# Patient Record
Sex: FEMALE | Race: BLACK OR AFRICAN AMERICAN | NOT HISPANIC OR LATINO | Employment: FULL TIME | URBAN - METROPOLITAN AREA
[De-identification: names, ages, dates, MRNs, and addresses within clinical notes are randomized per-mention and may not be internally consistent; named-entity substitution may affect disease eponyms.]

---

## 2021-03-25 ENCOUNTER — OFFICE VISIT (OUTPATIENT)
Dept: OBSTETRICS AND GYNECOLOGY | Facility: CLINIC | Age: 56
End: 2021-03-25
Payer: COMMERCIAL

## 2021-03-25 VITALS
HEIGHT: 67 IN | SYSTOLIC BLOOD PRESSURE: 113 MMHG | BODY MASS INDEX: 42.38 KG/M2 | WEIGHT: 270 LBS | DIASTOLIC BLOOD PRESSURE: 83 MMHG

## 2021-03-25 DIAGNOSIS — Z12.31 ENCOUNTER FOR SCREENING MAMMOGRAM FOR MALIGNANT NEOPLASM OF BREAST: ICD-10-CM

## 2021-03-25 DIAGNOSIS — Z01.419 ENCOUNTER FOR WELL WOMAN EXAM: Primary | ICD-10-CM

## 2021-03-25 PROCEDURE — 99386 PR PREVENTIVE VISIT,NEW,40-64: ICD-10-PCS | Mod: S$GLB,,, | Performed by: OBSTETRICS & GYNECOLOGY

## 2021-03-25 PROCEDURE — 99386 PREV VISIT NEW AGE 40-64: CPT | Mod: S$GLB,,, | Performed by: OBSTETRICS & GYNECOLOGY

## 2021-03-25 PROCEDURE — 3008F BODY MASS INDEX DOCD: CPT | Mod: CPTII,S$GLB,, | Performed by: OBSTETRICS & GYNECOLOGY

## 2021-03-25 PROCEDURE — 3008F PR BODY MASS INDEX (BMI) DOCUMENTED: ICD-10-PCS | Mod: CPTII,S$GLB,, | Performed by: OBSTETRICS & GYNECOLOGY

## 2021-03-25 RX ORDER — ALPRAZOLAM 0.5 MG/1
TABLET ORAL
COMMUNITY
End: 2023-02-02

## 2021-03-25 RX ORDER — DEXTROAMPHETAMINE SACCHARATE, AMPHETAMINE ASPARTATE, DEXTROAMPHETAMINE SULFATE AND AMPHETAMINE SULFATE 5; 5; 5; 5 MG/1; MG/1; MG/1; MG/1
1 TABLET ORAL DAILY
COMMUNITY
Start: 2021-03-02 | End: 2024-03-18

## 2021-03-25 RX ORDER — LOSARTAN POTASSIUM AND HYDROCHLOROTHIAZIDE 25; 100 MG/1; MG/1
1 TABLET ORAL DAILY
COMMUNITY
Start: 2021-01-16 | End: 2024-03-18

## 2021-03-25 RX ORDER — SCOLOPAMINE TRANSDERMAL SYSTEM 1 MG/1
PATCH, EXTENDED RELEASE TRANSDERMAL
COMMUNITY
End: 2021-03-25

## 2021-03-25 RX ORDER — OMEPRAZOLE 40 MG/1
CAPSULE, DELAYED RELEASE ORAL
COMMUNITY
Start: 2021-01-22

## 2021-03-25 RX ORDER — ZOSTER VACCINE RECOMBINANT, ADJUVANTED 50 MCG/0.5
KIT INTRAMUSCULAR
COMMUNITY
End: 2023-02-02

## 2021-03-25 RX ORDER — DICLOFENAC SODIUM 20 MG/G
SOLUTION TOPICAL
COMMUNITY
End: 2021-03-25

## 2021-03-25 RX ORDER — CITALOPRAM 20 MG/1
TABLET, FILM COATED ORAL
COMMUNITY
End: 2021-03-25

## 2021-03-25 RX ORDER — ARMODAFINIL 150 MG/1
TABLET ORAL
COMMUNITY
End: 2024-02-08

## 2021-03-25 RX ORDER — ASPIRIN 81 MG/1
TABLET ORAL
COMMUNITY
End: 2024-03-18

## 2021-03-25 RX ORDER — MELOXICAM 15 MG/1
TABLET ORAL
COMMUNITY
End: 2021-03-25

## 2021-03-25 RX ORDER — CYANOCOBALAMIN 1000 UG/ML
1 INJECTION, SOLUTION INTRAMUSCULAR; SUBCUTANEOUS
COMMUNITY
End: 2024-03-18

## 2021-03-25 RX ORDER — TOPIRAMATE 25 MG/1
25 TABLET ORAL DAILY
Qty: 60 TABLET | Refills: 1 | Status: SHIPPED | OUTPATIENT
Start: 2021-03-25 | End: 2021-06-21

## 2021-03-25 RX ORDER — ASPIRIN 325 MG
TABLET, DELAYED RELEASE (ENTERIC COATED) ORAL
COMMUNITY
End: 2021-03-25

## 2021-03-25 RX ORDER — ALBUTEROL SULFATE 90 UG/1
AEROSOL, METERED RESPIRATORY (INHALATION)
COMMUNITY
End: 2021-03-25

## 2021-03-30 ENCOUNTER — PATIENT MESSAGE (OUTPATIENT)
Dept: OBSTETRICS AND GYNECOLOGY | Facility: CLINIC | Age: 56
End: 2021-03-30

## 2021-06-21 RX ORDER — TOPIRAMATE 25 MG/1
TABLET ORAL
Qty: 90 TABLET | Refills: 3 | Status: SHIPPED | OUTPATIENT
Start: 2021-06-21

## 2023-01-12 DIAGNOSIS — Z01.419 ENCOUNTER FOR ANNUAL ROUTINE GYNECOLOGICAL EXAMINATION: Primary | ICD-10-CM

## 2023-02-02 ENCOUNTER — OFFICE VISIT (OUTPATIENT)
Dept: OBSTETRICS AND GYNECOLOGY | Facility: CLINIC | Age: 58
End: 2023-02-02
Payer: COMMERCIAL

## 2023-02-02 VITALS
HEART RATE: 62 BPM | WEIGHT: 267 LBS | SYSTOLIC BLOOD PRESSURE: 121 MMHG | DIASTOLIC BLOOD PRESSURE: 81 MMHG | BODY MASS INDEX: 41.82 KG/M2

## 2023-02-02 DIAGNOSIS — N95.2 VAGINAL ATROPHY: ICD-10-CM

## 2023-02-02 DIAGNOSIS — Z01.419 WELL WOMAN EXAM WITH ROUTINE GYNECOLOGICAL EXAM: Primary | ICD-10-CM

## 2023-02-02 PROCEDURE — 1159F MED LIST DOCD IN RCRD: CPT | Mod: CPTII,S$GLB,, | Performed by: OBSTETRICS & GYNECOLOGY

## 2023-02-02 PROCEDURE — 99396 PREV VISIT EST AGE 40-64: CPT | Mod: S$GLB,,, | Performed by: OBSTETRICS & GYNECOLOGY

## 2023-02-02 PROCEDURE — 3008F BODY MASS INDEX DOCD: CPT | Mod: CPTII,S$GLB,, | Performed by: OBSTETRICS & GYNECOLOGY

## 2023-02-02 PROCEDURE — 3074F PR MOST RECENT SYSTOLIC BLOOD PRESSURE < 130 MM HG: ICD-10-PCS | Mod: CPTII,S$GLB,, | Performed by: OBSTETRICS & GYNECOLOGY

## 2023-02-02 PROCEDURE — 3008F PR BODY MASS INDEX (BMI) DOCUMENTED: ICD-10-PCS | Mod: CPTII,S$GLB,, | Performed by: OBSTETRICS & GYNECOLOGY

## 2023-02-02 PROCEDURE — 3079F PR MOST RECENT DIASTOLIC BLOOD PRESSURE 80-89 MM HG: ICD-10-PCS | Mod: CPTII,S$GLB,, | Performed by: OBSTETRICS & GYNECOLOGY

## 2023-02-02 PROCEDURE — 1159F PR MEDICATION LIST DOCUMENTED IN MEDICAL RECORD: ICD-10-PCS | Mod: CPTII,S$GLB,, | Performed by: OBSTETRICS & GYNECOLOGY

## 2023-02-02 PROCEDURE — 99396 PR PREVENTIVE VISIT,EST,40-64: ICD-10-PCS | Mod: S$GLB,,, | Performed by: OBSTETRICS & GYNECOLOGY

## 2023-02-02 PROCEDURE — 3074F SYST BP LT 130 MM HG: CPT | Mod: CPTII,S$GLB,, | Performed by: OBSTETRICS & GYNECOLOGY

## 2023-02-02 PROCEDURE — 3079F DIAST BP 80-89 MM HG: CPT | Mod: CPTII,S$GLB,, | Performed by: OBSTETRICS & GYNECOLOGY

## 2023-02-02 RX ORDER — SEMAGLUTIDE 0.25 MG/.5ML
INJECTION, SOLUTION SUBCUTANEOUS
COMMUNITY
End: 2024-03-14

## 2023-02-02 NOTE — PROGRESS NOTES
Subjective:       Patient ID: Sondra Yan is a 57 y.o. female.    Chief Complaint:  Well Woman      History of Present Illness  Pt here for gyn annual.  History and past labs reviewed with patient.    Complaints frequent UTIs. Does admit to some vaginal dryness with intercourse.       Review of Systems  Review of Systems   Constitutional:  Negative for chills and fever.   Respiratory:  Negative for shortness of breath.    Cardiovascular:  Negative for chest pain.   Gastrointestinal:  Negative for abdominal pain, blood in stool, constipation, diarrhea, nausea, vomiting and reflux.   Genitourinary:  Negative for dysmenorrhea, dyspareunia, dysuria, hematuria, hot flashes, menorrhagia, menstrual problem, pelvic pain, vaginal bleeding, vaginal discharge, postcoital bleeding and vaginal dryness.   Musculoskeletal:  Negative for arthralgias and joint swelling.   Integumentary:  Negative for rash, hair changes, breast mass, nipple discharge and breast skin changes.   Psychiatric/Behavioral:  Negative for depression. The patient is not nervous/anxious.    Breast: Negative for asymmetry, lump, mass, nipple discharge and skin changes        Objective:     Vitals:    02/02/23 1359   BP: 121/81   Pulse: 62   Weight: 121.1 kg (267 lb)       Physical Exam:   Constitutional: She appears well-developed and well-nourished. No distress.    HENT:   Head: Normocephalic and atraumatic.    Eyes: Conjunctivae and EOM are normal.    Neck: No tracheal deviation present. No thyromegaly present.    Cardiovascular:       Exam reveals no clubbing, no cyanosis and no edema.        Pulmonary/Chest: Effort normal. No respiratory distress.        Abdominal: Soft. She exhibits no distension and no mass. There is no abdominal tenderness. There is no rebound and no guarding. No hernia.     Genitourinary:    Vagina, uterus and rectum normal.      Pelvic exam was performed with patient supine.   There is no rash, tenderness, lesion or injury on the  right labia. There is no rash, tenderness, lesion or injury on the left labia. Cervix is normal. Right adnexum displays no mass, no tenderness and no fullness. Left adnexum displays no mass, no tenderness and no fullness. Vaginal atrophy noted.                Skin: She is not diaphoretic. No cyanosis. Nails show no clubbing.        breast exam wnl- no nipple dc, skin changes, masses or lymph nodes palpated bilaterally    Assessment:        1. Well woman exam with routine gynecological exam    2. Vaginal atrophy                Plan:      Annual   Pap today   MMG ordered  Premarin cream rx and sample given   Risk assessment for inherited gyn cancer done   RTC  1 year     Patient was counseled today on current ASCCP pap guidelines, the recommendation for yearly pelvic exams, healthy diet and exercise routines, annual mammograms starting at age 40, and breast self awareness. She is to see her PCP for other health maintenance

## 2023-02-05 LAB — Lab: NORMAL

## 2024-01-23 DIAGNOSIS — Z01.419 ENCOUNTER FOR ANNUAL ROUTINE GYNECOLOGICAL EXAMINATION: Primary | ICD-10-CM

## 2024-02-08 ENCOUNTER — OFFICE VISIT (OUTPATIENT)
Dept: OBSTETRICS AND GYNECOLOGY | Facility: CLINIC | Age: 59
End: 2024-02-08
Payer: COMMERCIAL

## 2024-02-08 VITALS
DIASTOLIC BLOOD PRESSURE: 75 MMHG | BODY MASS INDEX: 43.01 KG/M2 | SYSTOLIC BLOOD PRESSURE: 108 MMHG | HEART RATE: 65 BPM | WEIGHT: 274.63 LBS

## 2024-02-08 DIAGNOSIS — Z00.00 ENCOUNTER FOR WELLNESS EXAMINATION: ICD-10-CM

## 2024-02-08 DIAGNOSIS — Z12.39 ENCOUNTER FOR SCREENING FOR MALIGNANT NEOPLASM OF BREAST, UNSPECIFIED SCREENING MODALITY: Primary | ICD-10-CM

## 2024-02-08 DIAGNOSIS — Z12.4 SCREENING FOR CERVICAL CANCER: ICD-10-CM

## 2024-02-08 DIAGNOSIS — Z13.820 SCREENING FOR OSTEOPOROSIS: ICD-10-CM

## 2024-02-08 PROCEDURE — 3008F BODY MASS INDEX DOCD: CPT | Mod: CPTII,S$GLB,, | Performed by: OBSTETRICS & GYNECOLOGY

## 2024-02-08 PROCEDURE — 99459 PELVIC EXAMINATION: CPT | Mod: S$GLB,,, | Performed by: OBSTETRICS & GYNECOLOGY

## 2024-02-08 PROCEDURE — 3074F SYST BP LT 130 MM HG: CPT | Mod: CPTII,S$GLB,, | Performed by: OBSTETRICS & GYNECOLOGY

## 2024-02-08 PROCEDURE — 3078F DIAST BP <80 MM HG: CPT | Mod: CPTII,S$GLB,, | Performed by: OBSTETRICS & GYNECOLOGY

## 2024-02-08 PROCEDURE — 99396 PREV VISIT EST AGE 40-64: CPT | Mod: S$GLB,,, | Performed by: OBSTETRICS & GYNECOLOGY

## 2024-02-08 PROCEDURE — 1159F MED LIST DOCD IN RCRD: CPT | Mod: CPTII,S$GLB,, | Performed by: OBSTETRICS & GYNECOLOGY

## 2024-02-08 RX ORDER — LOSARTAN POTASSIUM 100 MG/1
TABLET ORAL
COMMUNITY
End: 2024-03-14

## 2024-02-08 RX ORDER — NAPROXEN SODIUM 220 MG/1
TABLET, FILM COATED ORAL
COMMUNITY
End: 2024-03-18

## 2024-02-08 RX ORDER — BETAMETHASONE VALERATE 1.2 MG/G
AEROSOL, FOAM TOPICAL
COMMUNITY
End: 2024-03-14

## 2024-02-08 RX ORDER — MELOXICAM 15 MG/1
15 TABLET ORAL
COMMUNITY
Start: 2024-01-23 | End: 2024-03-14

## 2024-02-08 RX ORDER — PREDNISONE 20 MG/1
TABLET ORAL
COMMUNITY
Start: 2023-12-11 | End: 2024-03-14

## 2024-02-08 RX ORDER — CYANOCOBALAMIN 1000 UG/ML
1 INJECTION, SOLUTION INTRAMUSCULAR; SUBCUTANEOUS
COMMUNITY

## 2024-02-08 RX ORDER — TRAMADOL HYDROCHLORIDE 50 MG/1
TABLET ORAL
COMMUNITY
End: 2024-03-14

## 2024-02-08 NOTE — PROGRESS NOTES
Subjective:      Patient ID: Sondra Yan is a 58 y.o. female.    Chief Complaint:  Well Woman      History of Present Illness  HPI  This is a 58 year old female here for her annual exam and has no additional complaints  reports she is tired despite normal sleep and labs     GYN & OB History  No LMP recorded (lmp unknown). Patient is postmenopausal.   Date of Last Pap: No result found    OB History    Para Term  AB Living   2 2 2     3   SAB IAB Ectopic Multiple Live Births         1 3      # Outcome Date GA Lbr J Luis/2nd Weight Sex Delivery Anes PTL Lv   2A Term      Vag-Spont   RASHAD      Birth Comments: twins   2B Term      Vag-Spont   RASHAD   1 Term      Vag-Spont   RASHAD       Review of Systems  Review of Systems   Constitutional:  Negative for fatigue, fever and unexpected weight change.   Respiratory:  Negative for cough and shortness of breath.    Cardiovascular:  Negative for chest pain, palpitations and leg swelling.   Gastrointestinal:  Negative for abdominal pain, bloating, blood in stool, constipation, diarrhea, nausea and vomiting.   Genitourinary:  Negative for decreased libido, dysmenorrhea, dyspareunia, dysuria, frequency, genital sores, hematuria, menorrhagia, menstrual problem, pelvic pain, urgency, vaginal discharge, urinary incontinence and vaginal odor.   Musculoskeletal:  Negative for arthralgias and joint swelling.   Integumentary:  Negative for rash, mole/lesion, breast mass, nipple discharge, breast skin changes and breast tenderness.   Psychiatric/Behavioral: Negative.     Breast: Negative for asymmetry, lump, mass, nipple discharge, skin changes and tenderness         Objective:     Physical Exam:   Constitutional: She appears well-developed and well-nourished.      Neck: No thyroid mass and no thyromegaly present.     Pulmonary/Chest: Chest wall is not dull to percussion. She exhibits no mass, no tenderness, no bony tenderness, no laceration, no crepitus, no edema,  no deformity, no swelling and no retraction. Right breast exhibits no inverted nipple, no mass, no nipple discharge, no skin change, no tenderness, presence, no bleeding and no swelling. Left breast exhibits no inverted nipple, no mass, no nipple discharge, no skin change, no tenderness, presence, no bleeding and no swelling.        Abdominal: Soft. Bowel sounds are normal. There is no abdominal tenderness. Hernia confirmed negative in the right inguinal area and confirmed negative in the left inguinal area.     Genitourinary:    Vagina and uterus normal.      Pelvic exam was performed with patient supine.     Labial bartholins normal.There is no rash, tenderness, lesion or injury on the right labia. There is no rash, tenderness, lesion or injury on the left labia. Cervix is normal. Right adnexum displays no mass, no tenderness and no fullness. Left adnexum displays no mass, no tenderness and no fullness. No rectocele, cystocele or prolapse of vaginal walls in the vagina.                Skin: Skin is warm and dry.    Psychiatric: She has a normal mood and affect. Her speech is normal and behavior is normal.    Chaperone present        Assessment:     1. Encounter for screening for malignant neoplasm of breast, unspecified screening modality    2. Screening for cervical cancer    3. Screening for osteoporosis    4. Encounter for wellness examination              Plan:     Encounter for screening for malignant neoplasm of breast, unspecified screening modality  -     Mammo Digital Screening Bilat; Future; Expected date: 02/08/2024    Screening for cervical cancer  -     Liquid-based pap smear, screening    Screening for osteoporosis  -     DXA Bone Density Axial Skeleton 1 or more sites; Future; Expected date: 02/08/2024    Encounter for wellness examination

## 2024-02-09 DIAGNOSIS — Z12.11 SCREENING FOR COLON CANCER: Primary | ICD-10-CM

## 2024-02-12 ENCOUNTER — PATIENT MESSAGE (OUTPATIENT)
Dept: OBSTETRICS AND GYNECOLOGY | Facility: CLINIC | Age: 59
End: 2024-02-12
Payer: COMMERCIAL

## 2024-02-12 LAB — Lab: NORMAL

## 2024-03-06 ENCOUNTER — TELEPHONE (OUTPATIENT)
Dept: GASTROENTEROLOGY | Facility: CLINIC | Age: 59
End: 2024-03-06
Payer: COMMERCIAL

## 2024-03-06 DIAGNOSIS — Z12.11 COLON CANCER SCREENING: ICD-10-CM

## 2024-03-06 DIAGNOSIS — Z86.010 PERSONAL HISTORY OF COLONIC POLYPS: Primary | ICD-10-CM

## 2024-03-06 NOTE — TELEPHONE ENCOUNTER
We will need the records of her surgery to get more information. Find out who did her surgery and get records.   MLC

## 2024-03-06 NOTE — TELEPHONE ENCOUNTER
Called patient to direct rosa patient. Patient had her colon removed in 2018.  Patient wanted to know if she still needed to have colonoscopy. 3/6/24 LRA

## 2024-03-08 NOTE — TELEPHONE ENCOUNTER
Reviewed [facility]. [facility] the path from [facility]. Will review with Dr. Garcia..    Seems she had a right hemicolectomy on 10/17/2018 with Dr. Gudino.   Path reviewed:  Benign right ileocolectomy specimen with colonic diverticula.  Pericolic adipose tissue with scattered mixed acute and chronic inflammation in organizing fibrosis.  Benign appendix with fibrous obliteration.  Transmural defect of the colon adjacent to the cecum.  Iatrogenic defect is suspected.    MLC

## 2024-03-08 NOTE — TELEPHONE ENCOUNTER
OR note reviewed Patient had L hemicolectomy 10/12/2018 due to diverticular bleed. She still needs a colonoscopy. Only L side of large intestines were removed. It does note in his operative report previous lap-band? Please confirm with patient and add to surgery list. Will sign order once chart is updated.  AL

## 2024-03-08 NOTE — TELEPHONE ENCOUNTER
Spoke to patient. The colon surgery was by Dr. Gudino. The surgery was completed at [facility] looking in [facility] for records. If not will have to get patient to sign DOMINICK to request records. 3/8/24 LRA

## 2024-03-08 NOTE — TELEPHONE ENCOUNTER
Records from [id number] pulled from [facility]. ER visit, surgeries, path, along with EGD & upper gi series ordered by Dr. Butt. -KG

## 2024-03-10 NOTE — TELEPHONE ENCOUNTER
Records reviewed.  H/o colon polyps. Patient has essentially had a subtotal colectomy. She will need a colonoscopy with gastroscope (seems she may only have a short end portion of her colonoscopy). Prep will be CLD the day prior and one 238g bottle of MiraLAX. Technically she does not have to have sedation as she should not have pain from the procedure since she does not have much of a colon left, but ultimately up to the patient. If she does not want sedation, then we will need to request her to be the first (or last) case of the day and list on order. Let me know.  NBP

## 2024-03-11 NOTE — TELEPHONE ENCOUNTER
Called patient and she was in the middle of eating lunch. Patient stated she will call back later today. 3/11/24 LRA

## 2024-03-12 ENCOUNTER — TELEPHONE (OUTPATIENT)
Dept: GASTROENTEROLOGY | Facility: CLINIC | Age: 59
End: 2024-03-12

## 2024-03-12 NOTE — TELEPHONE ENCOUNTER
----- Message from Roseanna Cardoso sent at 3/12/2024 10:22 AM CDT -----  Contact: pt  Pt returning a call and can be reached at [phone]     Thanks,

## 2024-03-12 NOTE — TELEPHONE ENCOUNTER
Patient scheduled for Colonoscopy on July 24th. Patient states that since she will be having procedure in hospital anyway, she prefers to have sedation. Patient also informed that her prep will be a clear liquid diet with 1 bottle of miralax the day before. I did inform patient that we will be contacting her closer to time to review her prep instructions and day of procedure process.  BF

## 2024-03-14 VITALS — HEIGHT: 67 IN | BODY MASS INDEX: 41.12 KG/M2 | WEIGHT: 262 LBS

## 2024-03-14 RX ORDER — ASPIRIN 325 MG
1 TABLET, DELAYED RELEASE (ENTERIC COATED) ORAL
COMMUNITY

## 2024-03-14 RX ORDER — LOSARTAN POTASSIUM AND HYDROCHLOROTHIAZIDE 25; 100 MG/1; MG/1
1 TABLET ORAL DAILY
COMMUNITY

## 2024-03-14 RX ORDER — ALBUTEROL SULFATE 90 UG/1
AEROSOL, METERED RESPIRATORY (INHALATION)
COMMUNITY

## 2024-03-14 RX ORDER — POTASSIUM CHLORIDE 750 MG/1
1 TABLET, EXTENDED RELEASE ORAL DAILY
COMMUNITY
Start: 2024-02-26

## 2024-03-14 RX ORDER — DEXTROAMPHETAMINE SACCHARATE, AMPHETAMINE ASPARTATE, DEXTROAMPHETAMINE SULFATE AND AMPHETAMINE SULFATE 5; 5; 5; 5 MG/1; MG/1; MG/1; MG/1
1 TABLET ORAL DAILY
COMMUNITY
Start: 2024-01-29

## 2024-03-14 RX ORDER — ASPIRIN 81 MG/1
1 TABLET ORAL DAILY
COMMUNITY

## 2024-03-14 NOTE — TELEPHONE ENCOUNTER
Patient denied having any current problems or issues. Patient also denied having any hx of seizures, sleep apnea, or kidney disease.Patients maternal cousin had Crohn's at age 45.  Previous colonoscopy by Dr. Clifton on 3/11/2015. Findings were Multiple non-bleeding diverticula were seen in the whole colon. Diverticulosis appeared to be severe. Grade 1 internal and external hemorrhoids were noted. Skin tags were noted at anal canal-benign finding. Repeat colon in 5 years. Patient wants to have sedation. Patient advised to only take one bottle of mialax 238g and cld diet day prior. Asked patient to hold adderall medication 2 days prior to procedure. Chart was reviewed and updated with patient. Prep instructions were reviewed and sent to patients email at [email or web address].    Colonoscopy @ [facility] on July 24, 2024 with Dr. Garcia.-Reviewed by SHAWN

## 2024-07-18 ENCOUNTER — TELEPHONE (OUTPATIENT)
Dept: GASTROENTEROLOGY | Facility: CLINIC | Age: 59
End: 2024-07-18
Payer: COMMERCIAL

## 2024-07-18 VITALS — WEIGHT: 262 LBS | BODY MASS INDEX: 41.12 KG/M2 | HEIGHT: 67 IN

## 2024-07-18 DIAGNOSIS — Z12.11 COLON CANCER SCREENING: ICD-10-CM

## 2024-07-18 DIAGNOSIS — Z86.010 PERSONAL HISTORY OF COLONIC POLYPS: Primary | ICD-10-CM

## 2024-07-18 NOTE — TELEPHONE ENCOUNTER
Called and left a detailed message that I was calling as a courtesy regarding up coming Colon with NBP on 7/24/24, Wed and wanted to verify that she has her paper prep instructions and meds. I reminded pt not to take Adderall 2 days before the procedure. As well as not take Wegovy 7 days before the procedure. Pt stated she does not have a colon and not sure if she wants to have the procedure. That she would call back later.       I went back thru the notes and verified that she was a former [facility] pt and that [facility] would do a colonoscopy the same way [facility] did them in the past. I did tell pt about prep will be a CLD the day prior and one 238g bottle of MiraLax as her prep meds. It seems pt may only have the short end portion of her colon. sheryl

## 2024-07-18 NOTE — TELEPHONE ENCOUNTER
"[facility] [facility]  [address] [place] [address]  Phone: [phone]  Fax: [phone]    History & Physical         Provider: Dr. Fabiana Garcia    Patient Name: Sondra WAN (age):1965  59 y.o.           Gender: female   Phone: [phone]     Referring Physician: Emiliano Oconnell     Vital Signs:   Height - 5' 7"  Weight - 262 lb  BMI -  41.04    Plan: Colonoscopy w/gastroscope @ [facility]    Encounter Diagnoses   Name Primary?    Personal history of colonic polyps Yes    Colon cancer screening            History:      Past Medical History:   Diagnosis Date    ADHD     BMI 41     Essential (primary) hypertension     GERD (gastroesophageal reflux disease)     Personal history of colonic polyps       Past Surgical History:   Procedure Laterality Date    CHOLECYSTECTOMY      HEMICOLECTOMY Left 10/12/2018    diverticular bleed    REDUCTION OF BOTH BREASTS Bilateral     SUBTOTAL COLECTOMY  10/17/2018    extended left hemicolectomy, followed by completion of subtotal colectomy due to diverticular bleed    TUBAL LIGATION        Medication List with Changes/Refills   Current Medications    ALBUTEROL (PROAIR HFA) 90 MCG/ACTUATION INHALER    INHALE 2 PUFF(S) EVERY 4 HOURS BY INHALATION ROUTE AS NEEDED.    ASPIRIN (ECOTRIN) 81 MG EC TABLET    Take 1 tablet by mouth once daily.    CHOLECALCIFEROL, VITAMIN D3, 1,250 MCG (50,000 UNIT) CAPSULE    Take 1 capsule by mouth every 7 days.    CYANOCOBALAMIN 1,000 MCG/ML INJECTION    1 mL.    DEXTROAMPHETAMINE-AMPHETAMINE (ADDERALL) 20 MG TABLET    Take 1 tablet by mouth once daily.    LOSARTAN-HYDROCHLOROTHIAZIDE 100-25 MG (HYZAAR) 100-25 MG PER TABLET    Take 1 tablet by mouth once daily.    OMEPRAZOLE (PRILOSEC) 40 MG CAPSULE    TAKE 1 CAPSULE BY MOUTH TWICE A DAY AS DIRECTED    POTASSIUM CHLORIDE (KLOR-CON) 10 MEQ TBSR    Take 1 tablet by mouth once daily.    TOPIRAMATE " (TOPAMAX) 25 MG TABLET    TAKE 1 TABLET BY MOUTH EVERY DAY      Review of patient's allergies indicates:   Allergen Reactions    Penicillins Rash    Sulfa (sulfonamide antibiotics) Rash      Family History   Problem Relation Name Age of Onset    Hypertension Mother      Diabetes Mother      Heart disease Father      Heart attack Father      No Known Problems Sister      No Known Problems Brother      No Known Problems Maternal Grandmother      No Known Problems Maternal Grandfather      No Known Problems Paternal Grandmother      No Known Problems Paternal Grandfather      Breast cancer Maternal Aunt  55    Breast cancer Maternal Aunt  55    Breast cancer Maternal Cousin  50    Breast cancer Maternal Cousin  48    Breast cancer Maternal Cousin  49    Breast cancer Maternal Cousin  52    Crohn's disease Maternal Cousin  45    Colon cancer Neg Hx      Ovarian cancer Neg Hx      Uterine cancer Neg Hx      Cervical cancer Neg Hx      Melanoma Neg Hx      Pancreatic cancer Neg Hx      Prostate cancer Neg Hx        Social History     Tobacco Use    Smoking status: Former    Smokeless tobacco: Never   Substance Use Topics    Alcohol use: Not Currently    Drug use: Never        Physical Examination:     General Appearance:___________________________  HEENT: _____________________________________  Abdomen:____________________________________  Heart:________________________________________  Lungs:_______________________________________  Extremities:___________________________________  Skin:_________________________________________  Endocrine:____________________________________  Genitourinary:_________________________________  Neurological:__________________________________      Patient has been evaluated immediately prior to sedation and is medically cleared for endoscopy with IVCS as an ASA class: ______      Physician Signature: _________________________       Date: ________  Time: ________

## 2024-07-23 NOTE — TELEPHONE ENCOUNTER
Rec'd message from [facility]  Lab that pt has knee surgery and needs to be r/s. I called pt and she stated she had (R) total knee surgery about 6 weeks ago with Dr. Wilde. I told her that typically 6 mo before she is released from care. I tried r/s in January, but pt stated she has already met her ded and wants something this year. I r/s for 11/12/24 - Tues. I did explain to pt that if she is not released from care we will have to r.s her procedure. Pt acknowledge she understood.

## 2024-11-05 ENCOUNTER — TELEPHONE (OUTPATIENT)
Dept: GASTROENTEROLOGY | Facility: CLINIC | Age: 59
End: 2024-11-05
Payer: COMMERCIAL

## 2024-11-05 VITALS — BODY MASS INDEX: 41.12 KG/M2 | HEIGHT: 67 IN | WEIGHT: 262 LBS

## 2024-11-05 DIAGNOSIS — Z12.11 COLON CANCER SCREENING: ICD-10-CM

## 2024-11-05 DIAGNOSIS — Z86.0100 HISTORY OF COLONIC POLYPS: Primary | ICD-10-CM

## 2024-11-05 NOTE — TELEPHONE ENCOUNTER
"[facility] [facility] [facility]  [address] [place] [address]  Phone: [phone]  Fax: [phone]    History & Physical         Provider: Dr. Fabiana Garcia    Patient Name: Sondra WAN (age):1965  59 y.o.           Gender: female   Phone: [phone]     Referring Physician: Emiliano Oconnell     Vital Signs:   Height - 5' 7"  Weight - 262 lb  BMI -  41.04    Plan: Colonoscopy w/gastroscope @ [facility]    Encounter Diagnoses   Name Primary?    History of colonic polyps Yes    Colon cancer screening            History:      Past Medical History:   Diagnosis Date    ADHD     BMI 41     Essential (primary) hypertension     GERD (gastroesophageal reflux disease)     Personal history of colonic polyps       Past Surgical History:   Procedure Laterality Date    CHOLECYSTECTOMY      HEMICOLECTOMY Left 10/12/2018    diverticular bleed    REDUCTION OF BOTH BREASTS Bilateral     SUBTOTAL COLECTOMY  10/17/2018    extended left hemicolectomy, followed by completion of subtotal colectomy due to diverticular bleed    TUBAL LIGATION        Medication List with Changes/Refills   Current Medications    ALBUTEROL (PROAIR HFA) 90 MCG/ACTUATION INHALER    INHALE 2 PUFF(S) EVERY 4 HOURS BY INHALATION ROUTE AS NEEDED.    ASPIRIN (ECOTRIN) 81 MG EC TABLET    Take 1 tablet by mouth once daily.    CHOLECALCIFEROL, VITAMIN D3, 1,250 MCG (50,000 UNIT) CAPSULE    Take 1 capsule by mouth every 7 days.    CYANOCOBALAMIN 1,000 MCG/ML INJECTION    1 mL.    DEXTROAMPHETAMINE-AMPHETAMINE (ADDERALL) 20 MG TABLET    Take 1 tablet by mouth once daily.    LOSARTAN-HYDROCHLOROTHIAZIDE 100-25 MG (HYZAAR) 100-25 MG PER TABLET    Take 1 tablet by mouth once daily.    OMEPRAZOLE (PRILOSEC) 40 MG CAPSULE    TAKE 1 CAPSULE BY MOUTH TWICE A DAY AS DIRECTED    POTASSIUM CHLORIDE (KLOR-CON) 10 MEQ TBSR    Take 1 tablet by mouth once daily.    TOPIRAMATE (TOPAMAX) " 25 MG TABLET    TAKE 1 TABLET BY MOUTH EVERY DAY      Review of patient's allergies indicates:   Allergen Reactions    Penicillins Rash    Sulfa (sulfonamide antibiotics) Rash      Family History   Problem Relation Name Age of Onset    Hypertension Mother      Diabetes Mother      Heart disease Father      Heart attack Father      No Known Problems Sister      No Known Problems Brother      No Known Problems Maternal Grandmother      No Known Problems Maternal Grandfather      No Known Problems Paternal Grandmother      No Known Problems Paternal Grandfather      Breast cancer Maternal Aunt  55    Breast cancer Maternal Aunt  55    Breast cancer Maternal Cousin  50    Breast cancer Maternal Cousin  48    Breast cancer Maternal Cousin  49    Breast cancer Maternal Cousin  52    Crohn's disease Maternal Cousin  45    Colon cancer Neg Hx      Ovarian cancer Neg Hx      Uterine cancer Neg Hx      Cervical cancer Neg Hx      Melanoma Neg Hx      Pancreatic cancer Neg Hx      Prostate cancer Neg Hx        Social History     Tobacco Use    Smoking status: Former    Smokeless tobacco: Never   Substance Use Topics    Alcohol use: Not Currently    Drug use: Never        Physical Examination:     General Appearance:___________________________  HEENT: _____________________________________  Abdomen:____________________________________  Heart:________________________________________  Lungs:_______________________________________  Extremities:___________________________________  Skin:_________________________________________  Endocrine:____________________________________  Genitourinary:_________________________________  Neurological:__________________________________      Patient has been evaluated immediately prior to sedation and is medically cleared for endoscopy with IVCS as an ASA class: ______      Physician Signature: _________________________       Date: ________  Time: ________

## 2024-11-05 NOTE — TELEPHONE ENCOUNTER
S/w pt and told her that I was calling as a courtesy regarding up coming Colon with NBP on 11/12/24, Tuesday and wanted to verify that she has her paper prep instructions and meds. I reminded pt if she is taking Wegovy to not take it for 7 days before the procedure.  Pt stated she was in a meeting and she would call me back. sheryl

## 2024-11-08 NOTE — TELEPHONE ENCOUNTER
Nhung Correia Staff  Caller: self (Today, 11:48 AM)  Patient is requesting a call back regarding colonoscopy . Please call back at [phone]    11/8/2024 2:32 PM    Returned pt call. Pt stated she needs prep instructions emailed to the address on file. DONE. Pt also needs her prep meds sent to pharmacy [facility], Sent message to NP.  Reminded pt not to take Wegovy 7 days before the procedure. sheryl

## 2024-11-08 NOTE — TELEPHONE ENCOUNTER
Prep will be CLD the day prior and one 238g bottle of MiraLAX mixed with 64 ounces of liquid.   No prescription prep needed.   MLC

## 2024-11-11 ENCOUNTER — TELEPHONE (OUTPATIENT)
Dept: GASTROENTEROLOGY | Facility: CLINIC | Age: 59
End: 2024-11-11
Payer: COMMERCIAL

## 2024-11-11 ENCOUNTER — NURSE TRIAGE (OUTPATIENT)
Dept: ADMINISTRATIVE | Facility: CLINIC | Age: 59
End: 2024-11-11
Payer: COMMERCIAL

## 2024-11-11 NOTE — TELEPHONE ENCOUNTER
----- Message from Nhung sent at 11/11/2024  8:40 AM CST -----  Contact: self  Patient is requesting a call back regarding procedure tomorrow (sick with stomach virus. Please call back at [phone]

## 2024-11-11 NOTE — TELEPHONE ENCOUNTER
Returned pt call. Pt stated she's been having diarrhea for the last 48 hrs and wanted to verify if she can still proceed with her procedure tomorrow. I told her yes, as long as she doesn't have a fever or just doesn't feel she up to it. I also went over [facility] encounter about one 238g bottle of MiraLax mixed with 64 ounces of liquid. No rx prep needed. Pt acknowledge she understood. sheryl

## 2024-11-12 ENCOUNTER — OUTSIDE PLACE OF SERVICE (OUTPATIENT)
Dept: GASTROENTEROLOGY | Facility: CLINIC | Age: 59
End: 2024-11-12

## 2024-11-12 LAB — CRC RECOMMENDATION EXT: NORMAL

## 2024-11-12 PROCEDURE — G0105 COLORECTAL SCRN; HI RISK IND: HCPCS | Mod: ,,, | Performed by: INTERNAL MEDICINE

## 2024-11-12 NOTE — TELEPHONE ENCOUNTER
"Patient calling to verify her prep instructions. Advised patient per note in epic she is to mix....  "one 238g bottle of MiraLax mixed with 64 ounces of liquid"   She is also to be on a clear liquid diet today. Instructed to call back with additional questions or worsening of symptoms. Patient verbalized understanding.     Reason for Disposition   Question about upcoming scheduled surgery, procedure or test, no triage required, and triager able to answer question    Protocols used: Information Only Call - No Triage-A-    "

## 2024-12-05 ENCOUNTER — DOCUMENTATION ONLY (OUTPATIENT)
Dept: GASTROENTEROLOGY | Facility: CLINIC | Age: 59
End: 2024-12-05
Payer: COMMERCIAL

## 2025-01-29 ENCOUNTER — TELEPHONE (OUTPATIENT)
Dept: GASTROENTEROLOGY | Facility: CLINIC | Age: 60
End: 2025-01-29
Payer: COMMERCIAL

## 2025-01-29 NOTE — TELEPHONE ENCOUNTER
----- Message from [facility] sent at 1/28/2025 11:39 AM CST -----  Contact: [facility]  [facility]  .Type:  Patient Requesting Call    Who Called:[facility]  [facility]  Does the patient know what this is regarding?:most recent colon report   Would the patient rather a call back or a response via [facility]? call  Best Call Back Number:[phone]  Additional Information: fax :[phone]

## 2025-02-12 ENCOUNTER — PATIENT MESSAGE (OUTPATIENT)
Dept: OBSTETRICS AND GYNECOLOGY | Facility: CLINIC | Age: 60
End: 2025-02-12
Payer: COMMERCIAL

## 2025-02-17 ENCOUNTER — OFFICE VISIT (OUTPATIENT)
Dept: OBSTETRICS AND GYNECOLOGY | Facility: CLINIC | Age: 60
End: 2025-02-17
Payer: COMMERCIAL

## 2025-02-17 VITALS
HEART RATE: 107 BPM | BODY MASS INDEX: 42.38 KG/M2 | SYSTOLIC BLOOD PRESSURE: 132 MMHG | DIASTOLIC BLOOD PRESSURE: 85 MMHG | HEIGHT: 67 IN | WEIGHT: 270 LBS

## 2025-02-17 DIAGNOSIS — Z12.31 SCREENING MAMMOGRAM, ENCOUNTER FOR: ICD-10-CM

## 2025-02-17 DIAGNOSIS — Z01.419 ROUTINE GYNECOLOGICAL EXAMINATION: Primary | ICD-10-CM

## 2025-02-17 RX ORDER — MELOXICAM 7.5 MG/1
TABLET ORAL
COMMUNITY

## 2025-02-17 RX ORDER — OXYCODONE AND ACETAMINOPHEN 5; 325 MG/1; MG/1
TABLET ORAL
COMMUNITY

## 2025-02-17 RX ORDER — MECLIZINE HYDROCHLORIDE 25 MG/1
1 TABLET ORAL 3 TIMES DAILY
COMMUNITY

## 2025-02-17 RX ORDER — SEMAGLUTIDE 1 MG/.5ML
INJECTION, SOLUTION SUBCUTANEOUS
COMMUNITY

## 2025-02-17 NOTE — PROGRESS NOTES
"    Subjective:       Patient ID: Sondra Yan is a 59 y.o. female.    Chief Complaint:  No chief complaint on file.      History of Present Illness  Pt here for gyn annual.  History and past labs reviewed with patient.    Complaints none       Review of Systems  Review of Systems   Constitutional:  Negative for chills and fever.   Respiratory:  Negative for shortness of breath.    Cardiovascular:  Negative for chest pain.   Gastrointestinal:  Negative for abdominal pain, blood in stool, constipation, diarrhea, nausea, vomiting and reflux.   Genitourinary:  Negative for dysmenorrhea, dyspareunia, dysuria, hematuria, hot flashes, menorrhagia, menstrual problem, pelvic pain, vaginal bleeding, vaginal discharge, postcoital bleeding and vaginal dryness.   Musculoskeletal:  Negative for arthralgias and joint swelling.   Integumentary:  Negative for rash, hair changes, breast mass, nipple discharge and breast skin changes.   Psychiatric/Behavioral:  Negative for depression. The patient is not nervous/anxious.    Breast: Negative for asymmetry, lump, mass, nipple discharge and skin changes          Objective:     Vitals:    02/17/25 0925   BP: 132/85   Pulse: 107   Weight: 122.5 kg (270 lb)   Height: 5' 7" (1.702 m)       Physical Exam:   Constitutional: She appears well-developed and well-nourished. No distress.    HENT:   Head: Normocephalic and atraumatic.    Eyes: Conjunctivae and EOM are normal.    Neck: No tracheal deviation present. No thyromegaly present.    Cardiovascular:       Exam reveals no clubbing, no cyanosis and no edema.        Pulmonary/Chest: Effort normal. No respiratory distress.        Abdominal: Soft. She exhibits no distension and no mass. There is no abdominal tenderness. There is no rebound and no guarding. No hernia.     Genitourinary:    Vagina, uterus and rectum normal.      Pelvic exam was performed with patient supine.   There is no rash, tenderness, lesion or injury on the right labia. " There is no rash, tenderness, lesion or injury on the left labia. Cervix is normal. Right adnexum displays no mass, no tenderness and no fullness. Left adnexum displays no mass, no tenderness and no fullness. Vaginal atrophy noted.                Skin: She is not diaphoretic. No cyanosis. Nails show no clubbing.          breast exam wnl- no nipple dc, skin changes, masses or lymph nodes palpated bilaterally    Assessment:        1. Routine gynecological examination    2. Screening mammogram, encounter for                Plan:      Annual   Pap today   MMG ordered  Colonoscopy UTD   Risk assessment for inherited gyn cancer done   RTC  1 year     Patient was counseled today on current [facility]P pap guidelines, the recommendation for yearly pelvic exams, healthy diet and exercise routines, annual mammograms starting at age 40, and breast self awareness. She is to see her PCP for other health maintenance

## 2025-02-21 LAB — Lab: NORMAL
